# Patient Record
Sex: MALE | Race: WHITE | NOT HISPANIC OR LATINO | URBAN - METROPOLITAN AREA
[De-identification: names, ages, dates, MRNs, and addresses within clinical notes are randomized per-mention and may not be internally consistent; named-entity substitution may affect disease eponyms.]

---

## 2017-02-17 ENCOUNTER — OFFICE VISIT - RIVER FALLS (OUTPATIENT)
Dept: FAMILY MEDICINE | Facility: CLINIC | Age: 27
End: 2017-02-17

## 2022-02-11 VITALS
WEIGHT: 190.6 LBS | TEMPERATURE: 97.9 F | DIASTOLIC BLOOD PRESSURE: 67 MMHG | SYSTOLIC BLOOD PRESSURE: 113 MMHG | BODY MASS INDEX: 23.82 KG/M2 | HEART RATE: 55 BPM

## 2022-02-16 NOTE — PROGRESS NOTES
Patient:   MONET LOW            MRN: [id number]            FIN: [id number]               Age:   26 years     Sex:  Male     :  1990   Associated Diagnoses:   Chronic headache   Author:   Joon Nolasco MD      Visit Information      Date of Service: 2017 02:27 pm  Performing Location: [facility]  Encounter#: [id number]      Primary Care Provider (PCP):  Joon Nolasco MD    NPI# [id number]      Referring Provider:  No referring provider recorded for selected visit.      Chief Complaint   2017 2:30 PM CST    Requesting lymes testing.  Denies symptoms.        History of Present Illness   parents want lyme test  pt knows of no exposure  gets occas rare headache simple to treat      Review of Systems   Constitutional:  Negative.    Eye:  Negative.    Ear/Nose/Mouth/Throat:  Negative.    Respiratory:  Negative.    Cardiovascular:  Negative.    Gastrointestinal:  Negative.    Genitourinary:  Negative.    Hematology/Lymphatics:  Negative.    Endocrine:  Negative.    Immunologic:  Negative.    Musculoskeletal:  Negative.    Integumentary:  Negative.    Neurologic:  Negative except as documented in history of present illness.             Health Status   Allergies:    Nonallergic Reactions (Selected)  Severity Not Documented  Amoxicillin (Stomach upset)   Medications:  (Selected)   Prescriptions  Prescribed  Flovent  mcg/inh inhalation aerosol: See Instructions, Instructions: INHALE ONE PUFF TWICE DAILY, # 1 EA, 0 Refill(s), Pharmacy: [facility] [id number], Pt due for appt for further refills, INHALE ONE PUFF TWICE DAILY  albuterol CFC free 90 mcg/inh inhalation aerosol: 2 puff(s), inh, q4-6 hrs, # 2 EA, 5 Refill(s), Type: Maintenance, Pharmacy: [id number], 2 puff(s) inh q4-6 hrs  Documented Medications  Documented  lithium 600 mg oral capsule: 2 cap(s) ( 1,200 mg ), po, daily, 0 Refill(s), Type: Maintenance   Problem list:    All Problems  (Selected)  Asthma, Mild Persistent / ICD-9-.90 / Confirmed      Histories   Past Medical History:    Active  Asthma, Mild Persistent (493.90)   Family History:       Procedure history:    Eye muscle surgery (SNOMED CT [id number]) in the month of 7/2013 at 22 Years.  Comments:  1/13/2014 1:11 PM - Valerie CORREIA, Moises ZAMORA   Social History:        Tobacco Assessment: Denies Tobacco Use        Physical Examination   Vital Signs   2/17/2017 2:30 PM CST Temperature Tympanic 97.9 DegF    Apical Heart Rate 55 bpm  LOW    Systolic Blood Pressure 113 mmHg    Diastolic Blood Pressure 67 mmHg    Mean Arterial Pressure 82 mmHg      Measurements from flowsheet : Measurements   2/17/2017 2:30 PM CST    Weight Measured - Standard                190.6 lb     General:  Alert and oriented, No acute distress.    Neurologic:  Alert, Oriented.       Impression and Plan   Diagnosis     Chronic headache ([id number] R51).     Course:  Progressing as expected.    Plan:  lyme.    Patient Instructions:       Counseled: Patient, Regarding diagnosis, Regarding treatment, Regarding medications.